# Patient Record
(demographics unavailable — no encounter records)

---

## 2025-06-19 NOTE — HISTORY OF PRESENT ILLNESS
[Patient reported PAP Smear was normal] : Patient reported PAP Smear was normal [N] : Patient denies prior pregnancies [unknown] : Patient is unsure of the date of her LMP [Currently Active] : currently active [Men] : men [No] : No [FreeTextEntry1] : Valentine Aguirre presents for continuation of prenatal care. She did IVF. [facility] IVF Dr. 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